# Patient Record
Sex: MALE | Race: WHITE | Employment: OTHER | URBAN - METROPOLITAN AREA
[De-identification: names, ages, dates, MRNs, and addresses within clinical notes are randomized per-mention and may not be internally consistent; named-entity substitution may affect disease eponyms.]

---

## 2020-01-23 ENCOUNTER — HOSPITAL ENCOUNTER (OUTPATIENT)
Age: 70
Discharge: HOME | End: 2020-01-23
Payer: MEDICARE

## 2020-01-23 LAB — GLUCOSE FASTING: 103 MG/DL (ref 70–99)

## 2020-01-23 PROCEDURE — 84681 ASSAY OF C-PEPTIDE: CPT

## 2020-01-23 PROCEDURE — 82941 ASSAY OF GASTRIN: CPT

## 2020-01-23 PROCEDURE — 83525 ASSAY OF INSULIN: CPT

## 2020-01-23 PROCEDURE — 84206 ASSAY OF PROINSULIN: CPT

## 2020-01-23 PROCEDURE — 36415 COLL VENOUS BLD VENIPUNCTURE: CPT

## 2020-01-23 PROCEDURE — 82947 ASSAY GLUCOSE BLOOD QUANT: CPT

## 2020-01-23 PROCEDURE — 84586 ASSAY OF VIP: CPT

## 2020-01-23 PROCEDURE — 83519 RIA NONANTIBODY: CPT

## 2020-01-24 LAB
C-PEPTIDE: 5.5 NG/ML (ref 0.8–3.5)
C-PEPTIDE: ABNORMAL NG/ML (ref 0.8–3.5)
INSULIN: 51 UIU/ML
INSULIN: NORMAL UIU/ML

## 2020-01-25 LAB
GASTRIN: <10 PG/ML (ref 0–100)
GASTRIN: NORMAL PG/ML (ref 0–100)
Lab: NORMAL
Lab: NORMAL
TEST NAME: NORMAL

## 2020-01-31 LAB
Lab: NORMAL
Lab: NORMAL
TEST NAME: NORMAL

## 2020-02-04 LAB
VASOACTIVE INTESTINAL POLYPEPTIDE PLASMA: <13 PG/ML (ref 0–60)
VASOACTIVE INTESTINAL POLYPEPTIDE PLASMA: NORMAL PG/ML (ref 0–60)

## 2020-02-05 LAB
Lab: NORMAL
Lab: NORMAL
TEST NAME: NORMAL

## 2020-08-24 ENCOUNTER — APPOINTMENT (OUTPATIENT)
Dept: CT IMAGING | Age: 70
End: 2020-08-24
Payer: MEDICARE

## 2020-08-24 ENCOUNTER — HOSPITAL ENCOUNTER (OUTPATIENT)
Age: 70
Setting detail: OBSERVATION
LOS: 1 days | Discharge: HOME | End: 2020-08-25
Attending: EMERGENCY MEDICINE | Admitting: EMERGENCY MEDICINE
Payer: MEDICARE

## 2020-08-24 ENCOUNTER — APPOINTMENT (OUTPATIENT)
Dept: MRI IMAGING | Age: 70
End: 2020-08-24
Payer: MEDICARE

## 2020-08-24 ENCOUNTER — APPOINTMENT (OUTPATIENT)
Dept: GENERAL RADIOLOGY | Age: 70
End: 2020-08-24
Payer: MEDICARE

## 2020-08-24 PROBLEM — H81.4 VERTIGO OF CENTRAL ORIGIN: Status: ACTIVE | Noted: 2020-08-24

## 2020-08-24 LAB
ALBUMIN SERPL-MCNC: 4.3 GM/DL (ref 3.4–5)
ALP BLD-CCNC: 49 IU/L (ref 40–129)
ALT SERPL-CCNC: 58 U/L (ref 10–40)
AMPHETAMINES: NEGATIVE
ANION GAP SERPL CALCULATED.3IONS-SCNC: 12 MMOL/L (ref 4–16)
AST SERPL-CCNC: 32 IU/L (ref 15–37)
BACTERIA: NEGATIVE /HPF
BARBITURATE SCREEN URINE: NEGATIVE
BASOPHILS ABSOLUTE: 0.1 K/CU MM
BASOPHILS RELATIVE PERCENT: 0.8 % (ref 0–1)
BENZODIAZEPINE SCREEN, URINE: NEGATIVE
BILIRUB SERPL-MCNC: 0.4 MG/DL (ref 0–1)
BILIRUBIN URINE: NEGATIVE MG/DL
BLOOD, URINE: NEGATIVE
BUN BLDV-MCNC: 22 MG/DL (ref 6–23)
CALCIUM SERPL-MCNC: 9.8 MG/DL (ref 8.3–10.6)
CANNABINOID SCREEN URINE: NEGATIVE
CHLORIDE BLD-SCNC: 99 MMOL/L (ref 99–110)
CLARITY: CLEAR
CO2: 22 MMOL/L (ref 21–32)
COCAINE METABOLITE: NEGATIVE
COLOR: YELLOW
CREAT SERPL-MCNC: 0.9 MG/DL (ref 0.9–1.3)
DIFFERENTIAL TYPE: ABNORMAL
EOSINOPHILS ABSOLUTE: 0.2 K/CU MM
EOSINOPHILS RELATIVE PERCENT: 1.7 % (ref 0–3)
GFR AFRICAN AMERICAN: >60 ML/MIN/1.73M2
GFR NON-AFRICAN AMERICAN: >60 ML/MIN/1.73M2
GLUCOSE BLD-MCNC: 114 MG/DL (ref 70–99)
GLUCOSE BLD-MCNC: 171 MG/DL (ref 70–99)
GLUCOSE BLD-MCNC: 179 MG/DL (ref 70–99)
GLUCOSE, URINE: NEGATIVE MG/DL
HCT VFR BLD CALC: 46.4 % (ref 42–52)
HEMOGLOBIN: 15.4 GM/DL (ref 13.5–18)
IMMATURE NEUTROPHIL %: 1.5 % (ref 0–0.43)
INR BLD: 0.98 INDEX
KETONES, URINE: NEGATIVE MG/DL
LEUKOCYTE ESTERASE, URINE: NEGATIVE
LYMPHOCYTES ABSOLUTE: 2.2 K/CU MM
LYMPHOCYTES RELATIVE PERCENT: 22.8 % (ref 24–44)
MCH RBC QN AUTO: 30 PG (ref 27–31)
MCHC RBC AUTO-ENTMCNC: 33.2 % (ref 32–36)
MCV RBC AUTO: 90.4 FL (ref 78–100)
MONOCYTES ABSOLUTE: 0.6 K/CU MM
MONOCYTES RELATIVE PERCENT: 6.2 % (ref 0–4)
MUCUS: ABNORMAL HPF
NITRITE URINE, QUANTITATIVE: NEGATIVE
NUCLEATED RBC %: 0 %
OPIATES, URINE: NEGATIVE
OXYCODONE: NEGATIVE
PDW BLD-RTO: 12.8 % (ref 11.7–14.9)
PH, URINE: 6 (ref 5–8)
PHENCYCLIDINE, URINE: NEGATIVE
PLATELET # BLD: 222 K/CU MM (ref 140–440)
PMV BLD AUTO: 10 FL (ref 7.5–11.1)
POTASSIUM SERPL-SCNC: 5 MMOL/L (ref 3.5–5.1)
PROTEIN UA: NEGATIVE MG/DL
PROTHROMBIN TIME: 11.9 SECONDS (ref 11.7–14.5)
RBC # BLD: 5.13 M/CU MM (ref 4.6–6.2)
RBC URINE: 1 /HPF (ref 0–3)
SEGMENTED NEUTROPHILS ABSOLUTE COUNT: 6.4 K/CU MM
SEGMENTED NEUTROPHILS RELATIVE PERCENT: 67 % (ref 36–66)
SODIUM BLD-SCNC: 133 MMOL/L (ref 135–145)
SPECIFIC GRAVITY UA: 1.03 (ref 1–1.03)
SQUAMOUS EPITHELIAL: <1 /HPF
TOTAL IMMATURE NEUTOROPHIL: 0.14 K/CU MM
TOTAL NUCLEATED RBC: 0 K/CU MM
TOTAL PROTEIN: 6.7 GM/DL (ref 6.4–8.2)
TRICHOMONAS: ABNORMAL /HPF
TROPONIN T: <0.01 NG/ML
TROPONIN T: <0.01 NG/ML
UROBILINOGEN, URINE: NORMAL MG/DL (ref 0.2–1)
WBC # BLD: 9.6 K/CU MM (ref 4–10.5)
WBC UA: ABNORMAL /HPF (ref 0–2)

## 2020-08-24 PROCEDURE — 82962 GLUCOSE BLOOD TEST: CPT

## 2020-08-24 PROCEDURE — 6370000000 HC RX 637 (ALT 250 FOR IP): Performed by: PHYSICIAN ASSISTANT

## 2020-08-24 PROCEDURE — 80307 DRUG TEST PRSMV CHEM ANLYZR: CPT

## 2020-08-24 PROCEDURE — 6360000002 HC RX W HCPCS: Performed by: PHYSICIAN ASSISTANT

## 2020-08-24 PROCEDURE — 96372 THER/PROPH/DIAG INJ SC/IM: CPT

## 2020-08-24 PROCEDURE — 70450 CT HEAD/BRAIN W/O DYE: CPT

## 2020-08-24 PROCEDURE — 36415 COLL VENOUS BLD VENIPUNCTURE: CPT

## 2020-08-24 PROCEDURE — 93005 ELECTROCARDIOGRAM TRACING: CPT | Performed by: EMERGENCY MEDICINE

## 2020-08-24 PROCEDURE — 85610 PROTHROMBIN TIME: CPT

## 2020-08-24 PROCEDURE — 96374 THER/PROPH/DIAG INJ IV PUSH: CPT

## 2020-08-24 PROCEDURE — G0378 HOSPITAL OBSERVATION PER HR: HCPCS

## 2020-08-24 PROCEDURE — 6360000004 HC RX CONTRAST MEDICATION: Performed by: PHYSICIAN ASSISTANT

## 2020-08-24 PROCEDURE — 94761 N-INVAS EAR/PLS OXIMETRY MLT: CPT

## 2020-08-24 PROCEDURE — 6370000000 HC RX 637 (ALT 250 FOR IP): Performed by: INTERNAL MEDICINE

## 2020-08-24 PROCEDURE — 85025 COMPLETE CBC W/AUTO DIFF WBC: CPT

## 2020-08-24 PROCEDURE — 2580000003 HC RX 258: Performed by: PHYSICIAN ASSISTANT

## 2020-08-24 PROCEDURE — 99285 EMERGENCY DEPT VISIT HI MDM: CPT

## 2020-08-24 PROCEDURE — 70551 MRI BRAIN STEM W/O DYE: CPT

## 2020-08-24 PROCEDURE — 70496 CT ANGIOGRAPHY HEAD: CPT

## 2020-08-24 PROCEDURE — 80053 COMPREHEN METABOLIC PANEL: CPT

## 2020-08-24 PROCEDURE — 2580000003 HC RX 258: Performed by: INTERNAL MEDICINE

## 2020-08-24 PROCEDURE — 71045 X-RAY EXAM CHEST 1 VIEW: CPT

## 2020-08-24 PROCEDURE — 6360000002 HC RX W HCPCS: Performed by: INTERNAL MEDICINE

## 2020-08-24 PROCEDURE — 70498 CT ANGIOGRAPHY NECK: CPT

## 2020-08-24 PROCEDURE — 84484 ASSAY OF TROPONIN QUANT: CPT

## 2020-08-24 PROCEDURE — 81001 URINALYSIS AUTO W/SCOPE: CPT

## 2020-08-24 RX ORDER — PROMETHAZINE HYDROCHLORIDE 25 MG/1
12.5 TABLET ORAL EVERY 6 HOURS PRN
Status: DISCONTINUED | OUTPATIENT
Start: 2020-08-24 | End: 2020-08-25 | Stop reason: HOSPADM

## 2020-08-24 RX ORDER — OYSTER SHELL CALCIUM WITH VITAMIN D 500; 200 MG/1; [IU]/1
1 TABLET, FILM COATED ORAL DAILY
COMMUNITY

## 2020-08-24 RX ORDER — ATORVASTATIN CALCIUM 10 MG/1
10 TABLET, FILM COATED ORAL NIGHTLY
COMMUNITY

## 2020-08-24 RX ORDER — ACETAMINOPHEN 650 MG/1
650 SUPPOSITORY RECTAL EVERY 6 HOURS PRN
Status: DISCONTINUED | OUTPATIENT
Start: 2020-08-24 | End: 2020-08-25 | Stop reason: HOSPADM

## 2020-08-24 RX ORDER — ONDANSETRON 2 MG/ML
4 INJECTION INTRAMUSCULAR; INTRAVENOUS EVERY 30 MIN PRN
Status: DISCONTINUED | OUTPATIENT
Start: 2020-08-24 | End: 2020-08-24

## 2020-08-24 RX ORDER — MECLIZINE HCL 12.5 MG/1
12.5 TABLET ORAL 3 TIMES DAILY PRN
Status: DISCONTINUED | OUTPATIENT
Start: 2020-08-24 | End: 2020-08-25 | Stop reason: HOSPADM

## 2020-08-24 RX ORDER — ATORVASTATIN CALCIUM 10 MG/1
10 TABLET, FILM COATED ORAL NIGHTLY
Status: DISCONTINUED | OUTPATIENT
Start: 2020-08-24 | End: 2020-08-25 | Stop reason: HOSPADM

## 2020-08-24 RX ORDER — TAMSULOSIN HYDROCHLORIDE 0.4 MG/1
0.4 CAPSULE ORAL NIGHTLY
Status: DISCONTINUED | OUTPATIENT
Start: 2020-08-24 | End: 2020-08-25 | Stop reason: HOSPADM

## 2020-08-24 RX ORDER — SODIUM CHLORIDE 9 MG/ML
INJECTION, SOLUTION INTRAVENOUS CONTINUOUS
Status: DISCONTINUED | OUTPATIENT
Start: 2020-08-24 | End: 2020-08-25 | Stop reason: HOSPADM

## 2020-08-24 RX ORDER — SODIUM CHLORIDE 0.9 % (FLUSH) 0.9 %
10 SYRINGE (ML) INJECTION PRN
Status: DISCONTINUED | OUTPATIENT
Start: 2020-08-24 | End: 2020-08-25 | Stop reason: HOSPADM

## 2020-08-24 RX ORDER — FINASTERIDE 5 MG/1
5 TABLET, FILM COATED ORAL DAILY
COMMUNITY

## 2020-08-24 RX ORDER — ONDANSETRON 2 MG/ML
4 INJECTION INTRAMUSCULAR; INTRAVENOUS EVERY 6 HOURS PRN
Status: DISCONTINUED | OUTPATIENT
Start: 2020-08-24 | End: 2020-08-25 | Stop reason: HOSPADM

## 2020-08-24 RX ORDER — FAMOTIDINE 20 MG/1
20 TABLET, FILM COATED ORAL 2 TIMES DAILY
Status: DISCONTINUED | OUTPATIENT
Start: 2020-08-24 | End: 2020-08-25 | Stop reason: HOSPADM

## 2020-08-24 RX ORDER — SODIUM CHLORIDE 0.9 % (FLUSH) 0.9 %
10 SYRINGE (ML) INJECTION EVERY 12 HOURS SCHEDULED
Status: DISCONTINUED | OUTPATIENT
Start: 2020-08-24 | End: 2020-08-25 | Stop reason: HOSPADM

## 2020-08-24 RX ORDER — ACETAMINOPHEN 325 MG/1
650 TABLET ORAL EVERY 6 HOURS PRN
Status: DISCONTINUED | OUTPATIENT
Start: 2020-08-24 | End: 2020-08-25 | Stop reason: HOSPADM

## 2020-08-24 RX ORDER — POLYETHYLENE GLYCOL 3350 17 G/17G
17 POWDER, FOR SOLUTION ORAL DAILY PRN
Status: DISCONTINUED | OUTPATIENT
Start: 2020-08-24 | End: 2020-08-25 | Stop reason: HOSPADM

## 2020-08-24 RX ORDER — CALCIUM CARBONATE 200(500)MG
500 TABLET,CHEWABLE ORAL
COMMUNITY
Start: 2020-05-22

## 2020-08-24 RX ORDER — FINASTERIDE 5 MG/1
5 TABLET, FILM COATED ORAL DAILY
Status: DISCONTINUED | OUTPATIENT
Start: 2020-08-25 | End: 2020-08-25 | Stop reason: HOSPADM

## 2020-08-24 RX ORDER — TAMSULOSIN HYDROCHLORIDE 0.4 MG/1
0.4 CAPSULE ORAL NIGHTLY
COMMUNITY

## 2020-08-24 RX ORDER — SODIUM CHLORIDE 0.9 % (FLUSH) 0.9 %
10 SYRINGE (ML) INJECTION 2 TIMES DAILY
Status: DISCONTINUED | OUTPATIENT
Start: 2020-08-24 | End: 2020-08-25 | Stop reason: HOSPADM

## 2020-08-24 RX ORDER — ASPIRIN 81 MG/1
81 TABLET, CHEWABLE ORAL NIGHTLY
COMMUNITY

## 2020-08-24 RX ORDER — LISINOPRIL 10 MG/1
10 TABLET ORAL DAILY
COMMUNITY

## 2020-08-24 RX ORDER — MECLIZINE HYDROCHLORIDE 25 MG/1
25 TABLET ORAL ONCE
Status: COMPLETED | OUTPATIENT
Start: 2020-08-24 | End: 2020-08-24

## 2020-08-24 RX ORDER — ASPIRIN 81 MG/1
81 TABLET, CHEWABLE ORAL NIGHTLY
Status: DISCONTINUED | OUTPATIENT
Start: 2020-08-24 | End: 2020-08-25 | Stop reason: HOSPADM

## 2020-08-24 RX ORDER — SPIRONOLACTONE 25 MG/1
25 TABLET ORAL DAILY
COMMUNITY

## 2020-08-24 RX ADMIN — SODIUM CHLORIDE, PRESERVATIVE FREE 10 ML: 5 INJECTION INTRAVENOUS at 20:33

## 2020-08-24 RX ADMIN — FAMOTIDINE 20 MG: 20 TABLET, FILM COATED ORAL at 20:34

## 2020-08-24 RX ADMIN — MECLIZINE HYDROCHLORIDE 25 MG: 25 TABLET ORAL at 07:15

## 2020-08-24 RX ADMIN — ENOXAPARIN SODIUM 40 MG: 40 INJECTION SUBCUTANEOUS at 20:32

## 2020-08-24 RX ADMIN — ONDANSETRON 4 MG: 2 INJECTION INTRAMUSCULAR; INTRAVENOUS at 07:15

## 2020-08-24 RX ADMIN — SODIUM CHLORIDE: 9 INJECTION, SOLUTION INTRAVENOUS at 07:16

## 2020-08-24 RX ADMIN — MECLIZINE 12.5 MG: 12.5 TABLET ORAL at 20:32

## 2020-08-24 RX ADMIN — SODIUM CHLORIDE: 9 INJECTION, SOLUTION INTRAVENOUS at 18:54

## 2020-08-24 RX ADMIN — IOPAMIDOL 100 ML: 755 INJECTION, SOLUTION INTRAVENOUS at 06:46

## 2020-08-24 ASSESSMENT — PAIN SCALES - GENERAL
PAINLEVEL_OUTOF10: 0
PAINLEVEL_OUTOF10: 0

## 2020-08-24 NOTE — ED PROVIDER NOTES
Patient Identification  Christi Schulz is a 68 y.o. male    Chief Complaint  Nausea      HPI  (History provided by patient)  This is a 68 y.o. male who was brought in by EMS for chief complaint of nausea, dizziness. Patient reports he was already awake at 4 AM when symptoms started. States he began suddenly feeling like the room was spinning and nauseous, vomited twice. States there is spinning feeling it feels better if he closes his eyes and lays down. Still feels lightheaded in this position. Denies headache, vision changes, difficulty speaking or swallowing, numbness, weakness. + diaphoresis. No CP, SOB, abdominal pain. REVIEW OF SYSTEMS    Constitutional:  Denies fever, chills  HENT:  Denies sore throat or ear pain   Eyes: Denies vision changes, eye pain  Cardiovascular:  Denies chest pain, syncope  Respiratory:  Denies shortness of breath, cough   GI:  Denies abdominal pain. + nausea, vomiting  :  Denies dysuria, discharge  Musculoskeletal:  Denies back pain, joint pain  Skin:  Denies rash, pruritis  Neurologic:  Denies headache, focal weakness, or sensory changes     See HPI and nursing notes for additional information     I have reviewed the following nursing documentation:  Allergies: No Known Allergies    Past medical history:  has a past medical history of CAD (coronary artery disease) and Hypertension. Past surgical history:  has a past surgical history that includes hernia repair. Home medications:   Prior to Admission medications    Not on File       Social history:  reports that he has never smoked. He has never used smokeless tobacco. He reports previous alcohol use. He reports that he does not use drugs. Family history:  History reviewed. No pertinent family history. Exam  /66   Pulse 57   Resp 18   Ht 5' 10\" (1.778 m)   Wt 230 lb (104.3 kg)   SpO2 97%   BMI 33.00 kg/m²   Nursing note and vitals reviewed. Constitutional: Well developed, well nourished.  No acute distress. HENT:      Head: Normocephalic and atraumatic. Ears: External ears normal.      Nose: Nose normal.     Mouth: Membrane mucosa moist and pink. No posterior oropharynx erythema or tonsillar edema  Eyes: Anicteric sclera. No discharge, PERRL  Neck: Supple. Trachea midline. Cardiovascular: RRR, no murmurs, rubs, or gallops, radial pulses 2+ bilaterally. Pulmonary/Chest: Effort normal. No respiratory distress. CTAB. No stridor. No wheezes. No rales. Abdominal: Soft. Nontender to palpation. No distension. No guarding, rebound tenderness, or evidence of ascites. : No CVA tenderness. Musculoskeletal: Moves all extremities. No gross deformity. NEUROLOGICAL: Awake and alert. GCS 15. Cranial nerves 2-12 grossly intact. Strength 5/5 throughout. Light touch sensation intact throughout. Finger to nose WNL. Heel shin intact bilat. No expressive or receptive aphasia. Skin: Warm and dry. No rash. Psychiatric: Normal mood and affect. Behavior is normal.      EKG   Please see Dr. Osmany Alvarado' note for EKG read. Radiographs (if obtained):  [] The following radiograph was interpreted by myself in the absence of a radiologist:   [x] Radiologist's Report Reviewed:  XR CHEST PORTABLE   Final Result   No acute abnormality. CTA HEAD NECK W CONTRAST   Final Result   Unremarkable CTA of the head and neck. Mild cerebral white matter chronic microvascular ischemic disease. CT HEAD WO CONTRAST   Final Result   No acute intracranial abnormality. Mild cerebral white matter chronic microvascular ischemic disease. RECOMMENDATIONS:   For clinical suspicion of acute ischemia, recommend MRI brain with   diffusion-weighted imaging for further evaluation.          MRI BRAIN WO CONTRAST    (Results Pending)          Labs  Results for orders placed or performed during the hospital encounter of 08/24/20   CBC Auto Differential   Result Value Ref Range    WBC 9.6 4.0 - 10.5 K/CU MM RBC 5.13 4.6 - 6.2 M/CU MM    Hemoglobin 15.4 13.5 - 18.0 GM/DL    Hematocrit 46.4 42 - 52 %    MCV 90.4 78 - 100 FL    MCH 30.0 27 - 31 PG    MCHC 33.2 32.0 - 36.0 %    RDW 12.8 11.7 - 14.9 %    Platelets 581 583 - 818 K/CU MM    MPV 10.0 7.5 - 11.1 FL    Differential Type AUTOMATED DIFFERENTIAL     Segs Relative 67.0 (H) 36 - 66 %    Lymphocytes % 22.8 (L) 24 - 44 %    Monocytes % 6.2 (H) 0 - 4 %    Eosinophils % 1.7 0 - 3 %    Basophils % 0.8 0 - 1 %    Segs Absolute 6.4 K/CU MM    Lymphocytes Absolute 2.2 K/CU MM    Monocytes Absolute 0.6 K/CU MM    Eosinophils Absolute 0.2 K/CU MM    Basophils Absolute 0.1 K/CU MM    Nucleated RBC % 0.0 %    Total Nucleated RBC 0.0 K/CU MM    Total Immature Neutrophil 0.14 K/CU MM    Immature Neutrophil % 1.5 (H) 0 - 0.43 %   Comprehensive Metabolic Panel w/ Reflex to MG   Result Value Ref Range    Sodium 133 (L) 135 - 145 MMOL/L    Potassium 5.0 3.5 - 5.1 MMOL/L    Chloride 99 99 - 110 mMol/L    CO2 22 21 - 32 MMOL/L    BUN 22 6 - 23 MG/DL    CREATININE 0.9 0.9 - 1.3 MG/DL    Glucose 179 (H) 70 - 99 MG/DL    Calcium 9.8 8.3 - 10.6 MG/DL    Alb 4.3 3.4 - 5.0 GM/DL    Total Protein 6.7 6.4 - 8.2 GM/DL    Total Bilirubin 0.4 0.0 - 1.0 MG/DL    ALT 58 (H) 10 - 40 U/L    AST 32 15 - 37 IU/L    Alkaline Phosphatase 49 40 - 129 IU/L    GFR Non-African American >60 >60 mL/min/1.73m2    GFR African American >60 >60 mL/min/1.73m2    Anion Gap 12 4 - 16   Troponin   Result Value Ref Range    Troponin T <0.010 <0.01 NG/ML   Protime-INR   Result Value Ref Range    Protime 11.9 11.7 - 14.5 SECONDS    INR 0.98 INDEX   Urinalysis   Result Value Ref Range    Color, UA YELLOW YELLOW    Clarity, UA CLEAR CLEAR    Glucose, Urine NEGATIVE NEGATIVE MG/DL    Bilirubin Urine NEGATIVE NEGATIVE MG/DL    Ketones, Urine NEGATIVE NEGATIVE MG/DL    Specific Gravity, UA 1.034 1.001 - 1.035    Blood, Urine NEGATIVE NEGATIVE    pH, Urine 6.0 5.0 - 8.0    Protein, UA NEGATIVE NEGATIVE MG/DL    Urobilinogen, Urine NORMAL 0.2 - 1.0 MG/DL    Nitrite Urine, Quantitative NEGATIVE NEGATIVE    Leukocyte Esterase, Urine NEGATIVE NEGATIVE    RBC, UA 1 0 - 3 /HPF    WBC, UA NONE SEEN 0 - 2 /HPF    Bacteria, UA NEGATIVE NEGATIVE /HPF    Squam Epithel, UA <1 /HPF    Mucus, UA RARE (A) NEGATIVE HPF    Trichomonas, UA NONE SEEN NONE SEEN /HPF   Urine Drug Screen   Result Value Ref Range    Cannabinoid Scrn, Ur NEGATIVE NEGATIVE    Amphetamines NEGATIVE NEGATIVE    Cocaine Metabolite NEGATIVE NEGATIVE    Benzodiazepine Screen, Urine NEGATIVE NEGATIVE    Barbiturate Screen, Ur NEGATIVE NEGATIVE    Opiates, Urine NEGATIVE NEGATIVE    Phencyclidine, Urine NEGATIVE NEGATIVE    Oxycodone NEGATIVE NEGATIVE   POCT Glucose   Result Value Ref Range    POC Glucose 171 (H) 70 - 99 MG/DL   EKG 12 Lead   Result Value Ref Range    Ventricular Rate 57 BPM    Atrial Rate 57 BPM    P-R Interval 186 ms    QRS Duration 88 ms    Q-T Interval 418 ms    QTc Calculation (Bazett) 406 ms    P Axis 19 degrees    R Axis 107 degrees    T Axis 32 degrees    Diagnosis       Sinus bradycardia  Rightward axis  Borderline ECG  No previous ECGs available           MDM  Patient presents for sudden onset of what appears to be vertigo and vomiting. Symptoms persisted in ED, started on meclizine and Zofran and normal saline bolus only mild improvement. Patient had stroke alert called on arrival, CT head and CTA head and neck revealed no acute findings, seen by stroke neurologist who determined patient was not TPA candidate, recommended admission for MRI brain. Discussed with patient and he is comfortable with this. Consult placed to Dr. Shana Nguyen who agreed to admit. This patient was also seen and evaluated by Dr. Melissa Geronimo. Final Impression  1. Dizziness    2. Non-intractable vomiting with nausea, unspecified vomiting type        Blood pressure 110/66, pulse 57, resp. rate 18, height 5' 10\" (1.778 m), weight 230 lb (104.3 kg), SpO2 97 %.      Disposition:  Admit to telemetry in stable condition. Patient was given scripts for the following medications. I counseled patient how to take these medications. New Prescriptions    No medications on file       This chart was generated using the [address] [place] [address] [address]  dictation system. I created this record but it may contain dictation errors given the limitations of this technology.         Tim Howell PA-C  08/24/20 6923

## 2020-08-24 NOTE — ED NOTES
Report given to Maria Victoria Prakash. Pt will be transported to 3003.      Souleymane Dominguez RN  08/24/20 2067

## 2020-08-24 NOTE — ED TRIAGE NOTES
PT stated he woke up and started to feel dizzy and started vomiting. PT denies any pain at this time. PT states he feels light headed.

## 2020-08-24 NOTE — ED NOTES
Portable xray at bedside. States neuro is on with a second stroke alert and will be on robot when available.         Nora Decker RN  08/24/20 8101

## 2020-08-24 NOTE — ED NOTES
[id number] paged hospitalist     Marlen Del Valle  08/24/20 [id number] hospitalist returned call     Marlen Del Valle  08/24/20 8297

## 2020-08-24 NOTE — ED NOTES
Pt's spouse updated x's 3 on pt's condition. Spouse advised that VS are stable, MRI has been completed and that Dr will review results with pt once results are available.       Laurent Adair RN  08/24/20 9337

## 2020-08-24 NOTE — ED PROVIDER NOTES
I independently examined and evaluated Tarah Dior. Emergency Department Encounter    Patient: Tarah Dior  MRN: [id number]  : 1950  Date of Evaluation: 2020  ED Provider:  Mile Ramirez    Triage Chief Complaint:   Nausea    [place]:  Tarah Dior is a 68 y.o. male that presents with complaint of nausea, vomiting and dizziness. He was awake early this morning, at 4 AM noted sudden onset dizziness like room spinning, having nausea and vomiting. Prefers to keep his eyes closed because of how severe the symptoms are. No double vision or blurred vision. No falls or injury or trauma. No headache. No weakness on one side of his body or the other. No numbness on one side of his body or the other. No slurred speech. No confusion. ROS - see HPI, below listed is current ROS at time of my eval:  10 systems reviewed and negative except as above. Past Medical History:   Diagnosis Date    CAD (coronary artery disease)     Hypertension      Past Surgical History:   Procedure Laterality Date    HERNIA REPAIR       History reviewed. No pertinent family history.   Social History     Socioeconomic History    Marital status:      Spouse name: Not on file    Number of children: Not on file    Years of education: Not on file    Highest education level: Not on file   Occupational History    Not on file   Social Needs    Financial resource strain: Not on file    Food insecurity     Worry: Not on file     Inability: Not on file    Transportation needs     Medical: Not on file     Non-medical: Not on file   Tobacco Use    Smoking status: Never Smoker    Smokeless tobacco: Never Used   Substance and Sexual Activity    Alcohol use: Not Currently    Drug use: Never    Sexual activity: Not on file   Lifestyle    Physical activity     Days per week: Not on file     Minutes per session: Not on file    Stress: Not on file   Relationships    Social connections     Talks on phone: Not on file     Gets together: Not on file     Attends [employer] service: Not on file     Active member of club or organization: Not on file     Attends meetings of clubs or organizations: Not on file     Relationship status: Not on file    Intimate partner violence     Fear of current or ex partner: Not on file     Emotionally abused: Not on file     Physically abused: Not on file     Forced sexual activity: Not on file   Other Topics Concern    Not on file   Social History Narrative    Not on file     Current Facility-Administered Medications   Medication Dose Route Frequency Provider Last Rate Last Dose    ondansetron (ZOFRAN) injection 4 mg  4 mg Intravenous Q30 Min PRN [place] Guerrero PA-C   4 mg at 08/24/20 0715    0.9 % sodium chloride infusion   Intravenous Continuous Jamaica Guerrero PA-C 150 mL/hr at 08/24/20 3806      sodium chloride flush 0.9 % injection 10 mL  10 mL Intravenous BID Lazarus Guerrero PA-C         No current outpatient medications on file. No Known Allergies    Nursing Notes Reviewed    Physical Exam:  ED Triage Vitals   Enc Vitals Group      BP 08/24/20 0735 110/66      Pulse 08/24/20 0735 57      Resp 08/24/20 0735 18      Temp --       Temp src --       SpO2 08/24/20 0735 97 %      Weight 08/24/20 0632 230 lb (104.3 kg)      Height 08/24/20 0738 5' 10\" (1.778 m)      Head Circumference --       Peak Flow --       Pain Score --       Pain Loc --       Pain Edu? --       Excl. in [address]? --           My pulse ox interpretation is - normal    General appearance:  No acute distress. Skin:  Warm. Dry. No rash. Eye:  Extraocular movements intact. Ears, nose, mouth and throat:  Oral mucosa moist   Neck:  Trachea midline. Extremity:  No swelling. Normal ROM     Heart:  Regular rate and rhythm, normal S1 & S2, no extra heart sounds. Perfusion:  intact   Respiratory:  Lungs clear to auscultation bilaterally. Respirations nonlabored.      Abdominal:  Normal bowel sounds. Soft. Nontender. Non distended. Neurological:      Mental Status Exam:   Alert and oriented times three, follows commands, speech and language intact    Cranial Iafost-VX-TZH Intact.     Cranial nerve II  Visual acuity: normal  Cranial nerve III  Pupils: equal, round, reactive to light  Cranial nerves III, IV, VI  Extraocular Movements: intact  Cranial nerve V  Facial sensation: intact  Cranial nerve VII  Facial strength: intact  Cranial nerve VIII  Hearing: intact  Cranial nerve IX  Palate: intact  Cranial nerve XI  Shoulder shrug: intact  Cranial nerve XII  Tongue movement: normal    Motor:   Drift: absent  Motor exam is symmetrical 5 out of 5 all extremities bilaterally  Tone: normal  Abnormal Movements: Absent    DTRs- intact bilaterally and symmetrical.  Toes-downgoing bilaterally  Sensory:  Light Touch  Right Upper Extremity: normal  Left Upper Extremity: normal  Right Lower Extremity: normal  Left Lower Extremity: normal      I have reviewed and interpreted all of the currently available lab results from this visit (if applicable):  Results for orders placed or performed during the hospital encounter of 08/24/20   Comprehensive Metabolic Panel w/ Reflex to MG   Result Value Ref Range    Sodium 133 (L) 135 - 145 MMOL/L    Potassium 5.0 3.5 - 5.1 MMOL/L    Chloride 99 99 - 110 mMol/L    CO2 22 21 - 32 MMOL/L    BUN 22 6 - 23 MG/DL    CREATININE 0.9 0.9 - 1.3 MG/DL    Glucose 179 (H) 70 - 99 MG/DL    Calcium 9.8 8.3 - 10.6 MG/DL    Alb 4.3 3.4 - 5.0 GM/DL    Total Protein 6.7 6.4 - 8.2 GM/DL    Total Bilirubin 0.4 0.0 - 1.0 MG/DL    ALT 58 (H) 10 - 40 U/L    AST 32 15 - 37 IU/L    Alkaline Phosphatase 49 40 - 129 IU/L    GFR Non-African American >60 >60 mL/min/1.73m2    GFR African American >60 >60 mL/min/1.73m2    Anion Gap 12 4 - 16   Troponin   Result Value Ref Range    Troponin T <0.010 <0.01 NG/ML   Protime-INR   Result Value Ref Range    Protime 11.9 11.7 - 14.5 SECONDS    INR 0.98 INDEX POCT Glucose   Result Value Ref Range    POC Glucose 171 (H) 70 - 99 MG/DL   EKG 12 Lead   Result Value Ref Range    Ventricular Rate 57 BPM    Atrial Rate 57 BPM    P-R Interval 186 ms    QRS Duration 88 ms    Q-T Interval 418 ms    QTc Calculation (Bazett) 406 ms    P Axis 19 degrees    R Axis 107 degrees    T Axis 32 degrees    Diagnosis       Sinus bradycardia  Rightward axis  Borderline ECG  No previous ECGs available        Radiographs (if obtained):  Radiologist's Report Reviewed:  No results found. EKG (if obtained): (All EKG's are interpreted by myself in the absence of a cardiologist)  Is bradycardia with rate of 57 bpm, normal intervals. No ST elevation. Rightward axis. No previous to compare. Stroke alert timing details    1. Physician evaluation performed at:  0610  2. Stroke alert called at: 0610    3. CT results obtained at:  reviewed at 0659  4. Decision to administer tPA at:  N/A- NIH 0      t-PA NOT given due to the following EXCLUSION CRITERIA (only those listed):  Minor or isolated neurological signs      MDM:  49-year-old male with history as above presents with acute onset dizziness, nausea and vomiting. . Patient's initial NIH on my exam is 0. Stroke alert called given concern that could be a potential posterior stroke. Spoke with Dr. Lizzy Nolen on phone at [phone]- patient with NIH 0, likely not TPA candidate. She evaluated him on telestroke and confirmed no TPA. If [facility] concerning we will call them back, otherwise symptomatic treatment, likely peripheral but if not improving plan for admission for MRI/neuro. CT head and CT angio are negative. 26- Labs are unremarkable, plan for admission. All diagnostic, treatment, and disposition decisions were made by myself in conjunction with the advanced practice provider. For all further details of the patient's emergency department visit, please see the advanced practice provider's documentation.       Clinical Impression:  1. Dizziness    2. Non-intractable vomiting with nausea, unspecified vomiting type      Disposition referral (if applicable):  No follow-up provider specified. Disposition medications (if applicable):  New Prescriptions    No medications on file     ED Provider Disposition Time  DISPOSITION        Comment: Please note this report has been produced using speech recognition software and may contain errors related to that system including errors in grammar, punctuation, and spelling, as well as words and phrases that may be inappropriate. Efforts were made to edit the dictations.            Shelli Wiley MD  08/24/20 8508

## 2020-08-24 NOTE — H&P
History and Physical      Name:  Ramila Rock /Age/Sex: 1950  (68 y.o. male)   MRN & CSN:  [id number] & [id number] Admission Date/Time: 2020  5:45 AM   Location:  ED31/ED-31 PCP: Junior Butler 112 Day: 1    History of Present Illness:   History provided by patient and Patient's wife  Chief Complaint: Dizziness  Ramila Rock is a 68 y.o.  male  who presents with complaints of Dizziness, nausea and vomiting. He woke up in the morning at around 4 AM started having sudden onset of dizziness like room spinning, having nausea and vomiting. He describes episode as room spinning sensation. He also had an episode of diarrhea associated with that. He says has been having increased urinary frequency with no burning sensation. Denies having any chest pain, SOB, weakness, tingling, numbness, abdominal pain. Ten point ROS reviewed negative, unless as noted above    The patient presented with the above mentioned complaints, did CT head, CTA head and neck unremarkable. Tele neurology assessed patient, who said he is not a candidate for TPA as his NIHSS score is zero.     Objective:   No intake or output data in the 24 hours ending 20 1023   Vitals:   Vitals:    20 0735   BP: 110/66   Pulse: 57   Resp: 18   SpO2: 97%     Physical Exam:   General Appearance: alert and oriented to person, place and time, in no acute distress  Cardiovascular: normal rate, regular rhythm, normal S1 and S2  Pulmonary/Chest: clear to auscultation bilaterally  Abdomen: soft, non-tender, non-distended, normal bowel sounds, no masses   Extremities: no cyanosis, clubbing or edema, pulse   Skin: warm and dry, no rash or erythema  Head: normocephalic and atraumatic  Eyes: pupils equal, round, and reactive to light  Neck: supple and non-tender without mass, no thyromegaly   Musculoskeletal: normal range of motion, no joint swelling, deformity or tenderness  Neurological: alert, oriented, normal speech, no focal findings or movement disorder noted    Past Medical History:      Past Medical History:   Diagnosis Date    CAD (coronary artery disease)     Hypertension      PSHX:  has a past surgical history that includes hernia repair. Allergies: No Known Allergies    FAM HX: family history is not on file.   Soc HX:   Social History     Socioeconomic History    Marital status:      Spouse name: None    Number of children: None    Years of education: None    Highest education level: None   Occupational History    None   Social Needs    Financial resource strain: None    Food insecurity     Worry: None     Inability: None    Transportation needs     Medical: None     Non-medical: None   Tobacco Use    Smoking status: Never Smoker    Smokeless tobacco: Never Used   Substance and Sexual Activity    Alcohol use: Not Currently    Drug use: Never    Sexual activity: None   Lifestyle    Physical activity     Days per week: None     Minutes per session: None    Stress: None   Relationships    Social connections     Talks on phone: None     Gets together: None     Attends Christian service: None     Active member of club or organization: None     Attends meetings of clubs or organizations: None     Relationship status: None    Intimate partner violence     Fear of current or ex partner: None     Emotionally abused: None     Physically abused: None     Forced sexual activity: None   Other Topics Concern    None   Social History Narrative    None       Medications:   Medications:    sodium chloride flush  10 mL Intravenous BID      Infusions:    sodium chloride 150 mL/hr at 08/24/20 0716     PRN Meds: ondansetron, 4 mg, Q30 Min PRN        Labs:     CBC:   Lab Results   Component Value Date    WBC 9.6 08/24/2020    RBC 5.13 08/24/2020    HGB 15.4 08/24/2020    HCT 46.4 08/24/2020     08/24/2020    MCV 90.4 08/24/2020     BMP:    Lab Results   Component Value Date     08/24/2020 K 5.0 08/24/2020    CL 99 08/24/2020    CO2 22 08/24/2020    BUN 22 08/24/2020    CREATININE 0.9 08/24/2020    GLUCOSE 179 08/24/2020    CALCIUM 9.8 08/24/2020     Hepatic Function Panel:    Lab Results   Component Value Date    ALKPHOS 49 08/24/2020    AST 32 08/24/2020    ALT 58 08/24/2020    PROT 6.7 08/24/2020    LABALBU 4.3 08/24/2020    BILITOT 0.4 08/24/2020     Magnesium:  No results found for: MG  Cardiac Enzymes: No results found for: CKTOTAL, CKMB, CKMBINDEX, TROPONINI  LDH:  No results found for: LDH  PT/INR:    Lab Results   Component Value Date    PROTIME 11.9 08/24/2020    INR 0.98 08/24/2020     U/A:   Lab Results   Component Value Date    LEUKOCYTESUR NEGATIVE 08/24/2020    WBCUA NONE SEEN 08/24/2020    RBCUA 1 08/24/2020    BACTERIA NEGATIVE 08/24/2020    SPECGRAV 1.034 08/24/2020    BLOODU NEGATIVE 08/24/2020     ABG:  No results found for: PHART, YAF5YBA, PO2ART, M7LJONIQ, DBE4OJZ, BEART  TSH:  No results found for: TSH  Cardiac Enzymes: No results found for: CKTOTAL, CKMB, CKMBINDEX, TROPONINI    Assessment and Plan:   Aurelio Martinez is a 68 y.o.  male  who presents with Dizziness    Dizziness  Vertigo r/o central origin  Likely dehydration  Continue fluids  Initial Troponin negative  EKG with no ST/T changes  CT head with no acute changes  Repeat troponin X2  MRI WO contrast ordered  Discussed with wife, who is concerned about heart attack, explained that he does not have chest pain, acknowledging could be atypical symptoms, would get further cardiac work up. She discussed with her friend who is a retired general surgeon saying need for stat MRI or transfer to other hospital if we can not do it. Explained him and patient's wife that he is been already assessed by [place] [id number] neurology and determined as not a TPA candidate. I have explained that patient does not have any focal weakness and need to rule out for any posterior cerebral deficits.  His CTA with no major vessel occlusions, would not warrant intervention either  Cardiology consult placed  Ordered 2D ECHO  MRI brain with no evidence of acute stroke.  Updated wife regarding the results    Hyperparathyroidism s/p  Parathyroidectomy on 5/21/2020  Calcium 9.8 today  Will restart his Vitamin D3 and Calcium     CAD/Ischemic cardiomyopathy  Small inferior wall infarct with no ischemia per [facility] records  RCA occlusion in 2004  On Aspirin, will continue  Hold BB(Coreg 6.25 BID), Lisinopril 10 mg and Spironolactone(25 mg) due to borderline blood pressure, will restart as tolerated    HLD  Continue Statin    Hypertension  Hold Coreg and Lisinopril     Diet Diet NPO Effective Now   DVT Prophylaxis [x] Lovenox, []  Heparin, [] SCDs, [] Ambulation   GI Prophylaxis [] PPI,  [x] H2 Blocker,  [] Carafate,  [] Diet/Tube Feeds   Code Status Full code   Disposition Patient requires continued admission due to Vertigo   MDM [] Low, [x] Moderate,[]  High       Electronically signed by Simran Victoria MD on 8/24/2020 at 10:23 AM

## 2020-08-25 VITALS
WEIGHT: 230 LBS | BODY MASS INDEX: 32.93 KG/M2 | OXYGEN SATURATION: 100 % | DIASTOLIC BLOOD PRESSURE: 65 MMHG | TEMPERATURE: 97.8 F | RESPIRATION RATE: 20 BRPM | HEART RATE: 66 BPM | SYSTOLIC BLOOD PRESSURE: 108 MMHG | HEIGHT: 70 IN

## 2020-08-25 LAB
ANION GAP SERPL CALCULATED.3IONS-SCNC: 11 MMOL/L (ref 4–16)
BUN BLDV-MCNC: 18 MG/DL (ref 6–23)
CALCIUM SERPL-MCNC: 8.9 MG/DL (ref 8.3–10.6)
CHLORIDE BLD-SCNC: 103 MMOL/L (ref 99–110)
CO2: 21 MMOL/L (ref 21–32)
CREAT SERPL-MCNC: 0.7 MG/DL (ref 0.9–1.3)
EKG ATRIAL RATE: 57 BPM
EKG DIAGNOSIS: NORMAL
EKG P AXIS: 19 DEGREES
EKG P-R INTERVAL: 186 MS
EKG Q-T INTERVAL: 418 MS
EKG QRS DURATION: 88 MS
EKG QTC CALCULATION (BAZETT): 406 MS
EKG R AXIS: 107 DEGREES
EKG T AXIS: 32 DEGREES
EKG VENTRICULAR RATE: 57 BPM
GFR AFRICAN AMERICAN: >60 ML/MIN/1.73M2
GFR NON-AFRICAN AMERICAN: >60 ML/MIN/1.73M2
GLUCOSE BLD-MCNC: 114 MG/DL (ref 70–99)
HCT VFR BLD CALC: 40.4 % (ref 42–52)
HEMOGLOBIN: 13.4 GM/DL (ref 13.5–18)
LV EF: 53 %
LVEF MODALITY: NORMAL
MCH RBC QN AUTO: 30.5 PG (ref 27–31)
MCHC RBC AUTO-ENTMCNC: 33.2 % (ref 32–36)
MCV RBC AUTO: 92 FL (ref 78–100)
PDW BLD-RTO: 12.9 % (ref 11.7–14.9)
PLATELET # BLD: 191 K/CU MM (ref 140–440)
PMV BLD AUTO: 9.7 FL (ref 7.5–11.1)
POTASSIUM SERPL-SCNC: 4.3 MMOL/L (ref 3.5–5.1)
RBC # BLD: 4.39 M/CU MM (ref 4.6–6.2)
SODIUM BLD-SCNC: 135 MMOL/L (ref 135–145)
TROPONIN T: <0.01 NG/ML
WBC # BLD: 13.7 K/CU MM (ref 4–10.5)

## 2020-08-25 PROCEDURE — 6360000002 HC RX W HCPCS: Performed by: INTERNAL MEDICINE

## 2020-08-25 PROCEDURE — 99203 OFFICE O/P NEW LOW 30 MIN: CPT | Performed by: INTERNAL MEDICINE

## 2020-08-25 PROCEDURE — 36415 COLL VENOUS BLD VENIPUNCTURE: CPT

## 2020-08-25 PROCEDURE — 93010 ELECTROCARDIOGRAM REPORT: CPT | Performed by: INTERNAL MEDICINE

## 2020-08-25 PROCEDURE — 85027 COMPLETE CBC AUTOMATED: CPT

## 2020-08-25 PROCEDURE — 80048 BASIC METABOLIC PNL TOTAL CA: CPT

## 2020-08-25 PROCEDURE — 84484 ASSAY OF TROPONIN QUANT: CPT

## 2020-08-25 PROCEDURE — G0378 HOSPITAL OBSERVATION PER HR: HCPCS

## 2020-08-25 PROCEDURE — 6370000000 HC RX 637 (ALT 250 FOR IP): Performed by: INTERNAL MEDICINE

## 2020-08-25 PROCEDURE — 6370000000 HC RX 637 (ALT 250 FOR IP): Performed by: PHYSICIAN ASSISTANT

## 2020-08-25 PROCEDURE — 96372 THER/PROPH/DIAG INJ SC/IM: CPT

## 2020-08-25 PROCEDURE — 2580000003 HC RX 258: Performed by: INTERNAL MEDICINE

## 2020-08-25 PROCEDURE — 93306 TTE W/DOPPLER COMPLETE: CPT

## 2020-08-25 RX ADMIN — FINASTERIDE 5 MG: 5 TABLET, FILM COATED ORAL at 10:10

## 2020-08-25 RX ADMIN — FAMOTIDINE 20 MG: 20 TABLET, FILM COATED ORAL at 10:09

## 2020-08-25 RX ADMIN — SODIUM CHLORIDE: 9 INJECTION, SOLUTION INTRAVENOUS at 09:33

## 2020-08-25 RX ADMIN — SODIUM CHLORIDE, PRESERVATIVE FREE 10 ML: 5 INJECTION INTRAVENOUS at 10:10

## 2020-08-25 RX ADMIN — ENOXAPARIN SODIUM 40 MG: 40 INJECTION SUBCUTANEOUS at 10:10

## 2020-08-25 ASSESSMENT — PAIN SCALES - GENERAL
PAINLEVEL_OUTOF10: 0
PAINLEVEL_OUTOF10: 0

## 2020-08-25 NOTE — DISCHARGE SUMMARY
Discharge Summary Note  Patient ID:  Emil Crowder  [id number]  68 y.o.  1950    Admit date: 8/24/2020    Discharge date and time: No discharge date for patient encounter.      Admitting Physician: Paul Nelson MD     Discharge Physician: Jesus Ji MD    Admission Diagnoses:   Dizziness [R42]  Non-intractable vomiting with nausea, unspecified vomiting type [R11.2]  Vertigo of central origin [H81.4]    Discharge Diagnoses and hospital course:   Dizziness-Resolved  Likely dehydration  Improved with fluids  Initial Troponin negative  EKG with no ST/T changes  CT head with no acute changes  Repeat troponin's negative  MRI ruled out any acute stroke  Cardiology recommendations appreciated and said okay with discharging and following up with his cardiologist as out patient to get stress test   Ordered 2D ECHO, pending results     Hyperparathyroidism s/p  Parathyroidectomy on 5/21/2020  Continue home calcium replacements     CAD/Ischemic cardiomyopathy  Small inferior wall infarct with no ischemia per [facility] records  RCA occlusion in 2004  On Aspirin, will continue  Continue home medications     HLD  Continue Statin     Hypertension  Hold Coreg and Lisinopril     Admission Condition: fair    Discharged Condition: stable    Consults: cardiology    Significant Diagnostic Studies:   CBC with Differential:    Lab Results   Component Value Date    WBC 13.7 08/25/2020    RBC 4.39 08/25/2020    HGB 13.4 08/25/2020    HCT 40.4 08/25/2020     08/25/2020    MCV 92.0 08/25/2020    MCH 30.5 08/25/2020    MCHC 33.2 08/25/2020    RDW 12.9 08/25/2020    SEGSPCT 67.0 08/24/2020    LYMPHOPCT 22.8 08/24/2020    MONOPCT 6.2 08/24/2020    BASOPCT 0.8 08/24/2020    MONOSABS 0.6 08/24/2020    LYMPHSABS 2.2 08/24/2020    EOSABS 0.2 08/24/2020    BASOSABS 0.1 08/24/2020    DIFFTYPE AUTOMATED DIFFERENTIAL 08/24/2020     CMP:    Lab Results   Component Value Date     08/25/2020    K 4.3 08/25/2020     08/25/2020    CO2 21 08/25/2020    BUN 18 08/25/2020    CREATININE 0.7 08/25/2020    GFRAA >60 08/25/2020    LABGLOM >60 08/25/2020    GLUCOSE 114 08/25/2020    PROT 6.7 08/24/2020    LABALBU 4.3 08/24/2020    CALCIUM 8.9 08/25/2020    BILITOT 0.4 08/24/2020    ALKPHOS 49 08/24/2020    AST 32 08/24/2020    ALT 58 08/24/2020     Ct Head Wo Contrast    Result Date: 8/24/2020  EXAMINATION: CT OF THE HEAD WITHOUT CONTRAST  8/24/2020 6:37 am TECHNIQUE: CT of the head was performed without the administration of intravenous contrast. Dose modulation, iterative reconstruction, and/or weight based adjustment of the mA/kV was utilized to reduce the radiation dose to as low as reasonably achievable. COMPARISON: None. HISTORY: ORDERING SYSTEM PROVIDED HISTORY: dizziness, nausea/vomiting, started at [address] [place] [address] [place]: Has a \"code stroke\" or \"stroke alert\" been called? ->Yes Reason for exam:->dizziness, nausea/vomiting, started at 4am Reason for Exam: joiner/nausea FINDINGS: BRAIN/VENTRICLES: There is no acute intracranial hemorrhage, mass effect or midline shift. No abnormal extra-axial fluid collection. The gray-white differentiation is maintained without evidence of an acute infarct. There is no evidence of hydrocephalus. Ill-defined hypoattenuation is noted within cerebral white matter consistent with mild microvascular ischemic disease. ORBITS: The visualized portion of the orbits demonstrate no acute abnormality. SINUSES: The visualized paranasal sinuses and mastoid air cells demonstrate no acute abnormality. SOFT TISSUES/SKULL:  No acute abnormality of the visualized skull or soft tissues. No acute intracranial abnormality. Mild cerebral white matter chronic microvascular ischemic disease. RECOMMENDATIONS: For clinical suspicion of acute ischemia, recommend MRI brain with diffusion-weighted imaging for further evaluation.      Xr Chest Portable    Result Date: 8/24/2020  EXAMINATION: ONE XRAY VIEW OF THE CHEST 8/24/2020 6:49 am COMPARISON: None. HISTORY: Acute dizziness with nausea/vomiting. FINDINGS: Cardiomediastinal silhouette and pulmonary vasculature are normal. No consolidation, pleural effusion, or pneumothorax. No acute abnormality. Cta Head Neck W Contrast    Result Date: 8/24/2020  EXAMINATION: CTA OF THE HEAD AND NECK WITH CONTRAST 8/24/2020 6:45 am: TECHNIQUE: CTA of the head and neck was performed with the administration of intravenous contrast. Multiplanar reformatted images are provided for review. MIP images are provided for review. Stenosis of the internal carotid arteries measured using NASCET criteria. Dose modulation, iterative reconstruction, and/or weight based adjustment of the mA/kV was utilized to reduce the radiation dose to as low as reasonably achievable. COMPARISON: CT head without contrast August 24, 2020 at 0637 hours. HISTORY: ORDERING SYSTEM PROVIDED HISTORY: dizziness, nausea/vomiting, started at [address] [place]: Reason for exam:->dizziness, nausea/vomiting, started at 4am Reason for Exam: stroke alert FINDINGS: CTA NECK: AORTIC ARCH/ARCH VESSELS: No dissection or arterial injury. No significant stenosis of the brachiocephalic or subclavian arteries. CAROTID ARTERIES: No dissection, arterial injury, or hemodynamically significant stenosis by NASCET criteria. VERTEBRAL ARTERIES: No dissection, arterial injury, or significant stenosis. SOFT TISSUES: The lung apices are clear. No cervical or superior mediastinal lymphadenopathy. The larynx and pharynx are unremarkable. No acute abnormality of the salivary and thyroid glands. BONES: No acute osseous abnormality. CTA HEAD: ANTERIOR CIRCULATION: No significant stenosis of the intracranial internal carotid, anterior cerebral, or middle cerebral arteries. No aneurysm. POSTERIOR CIRCULATION: No significant stenosis of the vertebral, basilar, or posterior cerebral arteries. No aneurysm.  OTHER: No dural venous sinus thrombosis on this non-dedicated study. BRAIN: No mass effect or midline shift. No extra-axial fluid collection. The gray-white differentiation is maintained. Ill-defined hypoattenuation is noted within cerebral white matter consistent with mild microvascular ischemic change. Unremarkable CTA of the head and neck. Mild cerebral white matter chronic microvascular ischemic disease. Mri Brain Wo Contrast    Result Date: 8/24/2020  EXAMINATION: MRI OF THE BRAIN WITHOUT CONTRAST,  8/24/2020 10:36 am TECHNIQUE: Multiplanar multisequence MRI of the brain was performed without the administration of intravenous contrast. COMPARISON: Head CT on 08/24/2020 HISTORY: ORDERING SYSTEM PROVIDED HISTORY: Vertigo TECHNOLOGIST PROVIDED HISTORY: Reason for exam:-> Vertigo Reason for Exam: Dizziness/nausea vomiting FINDINGS: INTRACRANIAL STRUCTURES/VENTRICLES: There are no areas of restricted diffusion to suggest an acute infarct. The study is degraded by patient motion artifact. The cerebral and cerebellar parenchyma demonstrate volume loss. Scattered areas of increased T2 signal are noted supratentorially, compatible with moderate chronic microvascular white matter ischemic disease. There are no abnormal extra-axial fluid collections. The ventricles are normal in size. Normal major intracranial flow voids are noted. There are no areas of blooming artifact noted on the gradient echo sequences to suggest sequela of acute or chronic hemorrhage. ORBITS: The visualized portion of the orbits demonstrate no acute abnormality. SINUSES: There is scattered paranasal sinus disease, with the greatest involvement in the posterior ethmoid air cells, sphenoid sinuses, and left maxillary sinus. The mastoid air cells are clear. BONES/SOFT TISSUES: The bone marrow signal intensity and craniocervical junction are unremarkable. Pituitary gland is normal in appearance. 1. No evidence of an acute infarct.  2. Moderate chronic microvascular white matter ischemic disease with associated cerebral parenchymal volume loss.      Discharge Exam:  General Appearance: alert and oriented to person, place and time, in no acute distress  Cardiovascular: normal rate, regular rhythm, normal S1 and S2  Pulmonary/Chest: clear to auscultation bilaterally  Abdomen: soft, non-tender, non-distended, normal bowel sounds, no masses   Extremities: no cyanosis, clubbing or edema, pulse   Skin: warm and dry, no rash or erythema  Head: normocephalic and atraumatic  Eyes: pupils equal, round, and reactive to light  Neck: supple and non-tender without mass, no thyromegaly   Musculoskeletal: normal range of motion, no joint swelling, deformity or tenderness  Neurological: alert, oriented, normal speech, no focal findings or movement disorder noted    Disposition: home    Patient Instructions:     Discharge Medications:   Bryanna Kim"   Home Medication Instructions BOC:[id number]    Printed on:08/25/20 2435   Medication Information                      aspirin 81 MG chewable tablet  Take 81 mg by mouth nightly              atorvastatin (LIPITOR) 10 MG tablet  Take 10 mg by mouth nightly              calcium carbonate (TUMS) 500 MG chewable tablet  Take 500 mg by mouth             calcium-vitamin D (OSCAL-500) 500-200 MG-UNIT per tablet  Take 1 tablet by mouth daily             finasteride (PROSCAR) 5 MG tablet  Take 5 mg by mouth daily             lisinopril (PRINIVIL;ZESTRIL) 10 MG tablet  Take 10 mg by mouth daily             spironolactone (ALDACTONE) 25 MG tablet  Take 25 mg by mouth daily             tamsulosin (FLOMAX) 0.4 MG capsule  Take 0.4 mg by mouth nightly                  Activity: activity as tolerated    Diet: cardiac diet    Wound Care: none needed    Follow-up:  PCP 1-2 weeks  Cardiology 2-4 weeks    Time Spent Doing discharge 25 min  Electronically signed by Carlotta Guadarrama MD  on 8/25/20 at 11:46 AM EDT

## 2020-08-25 NOTE — CONSULTS
Daily with breakfast  tamsulosin (FLOMAX) capsule 0.4 mg, Nightly  finasteride (PROSCAR) tablet 5 mg, Daily  atorvastatin (LIPITOR) tablet 10 mg, Nightly  aspirin chewable tablet 81 mg, Nightly      Current Facility-Administered Medications   Medication Dose Route Frequency Provider Last Rate Last Dose    0.9 % sodium chloride infusion   Intravenous Continuous Javier Thorpe  mL/hr at 08/24/20 1854      sodium chloride flush 0.9 % injection 10 mL  10 mL Intravenous BID Javier Thorpe MD        sodium chloride flush 0.9 % injection 10 mL  10 mL Intravenous 2 times per day Javier Thorpe MD   10 mL at 08/24/20 2033    sodium chloride flush 0.9 % injection 10 mL  10 mL Intravenous PRN Javier Thorpe MD        acetaminophen (TYLENOL) tablet 650 mg  650 mg Oral Q6H PRN Javier Thorpe MD        Or    acetaminophen (TYLENOL) suppository 650 mg  650 mg Rectal Q6H PRN Javier Thorpe MD        polyethylene glycol (GLYCOLAX) packet 17 g  17 g Oral Daily PRN Javier Thorpe MD        promethazine (PHENERGAN) tablet 12.5 mg  12.5 mg Oral Q6H PRN Javier Thorpe MD        Or    ondansetron [facility]) injection 4 mg  4 mg Intravenous Q6H PRN Javier Thorpe MD        enoxaparin (LOVENOX) injection 40 mg  40 mg Subcutaneous Daily Javier Thorpe MD   40 mg at 08/24/20 2032    famotidine (PEPCID) tablet 20 mg  20 mg Oral BID Javier Thorpe MD   20 mg at 08/24/20 2034    meclizine (ANTIVERT) tablet 12.5 mg  12.5 mg Oral TID PRN Javier Thorpe MD   12.5 mg at 08/24/20 2032    calcium-vitamin D 500-200 MG-UNIT per tablet 1 tablet  1 tablet Oral Daily with breakfast Javier Thorpe MD        tamsulosin [facility]) capsule 0.4 mg  0.4 mg Oral Nightly Ni Bailey PA-C        finasteride (PROSCAR) tablet 5 mg  5 mg Oral Daily Ni Bailey PA-C        atorvastatin (LIPITOR) tablet 10 mg  10 mg Oral Ya Rivers PA-C        aspirin chewable tablet 81 mg  81 mg Oral Tylorly Almas Rivers PA-C         Review of Systems:   · Constitutional: No Fever or Weight Loss   · Eyes: No Decreased Vision  · ENT: No Headaches, Hearing Loss or Vertigo  · Cardiovascular: No chest pain, dyspnea on exertion, palpitations or loss of consciousness  · Respiratory: No cough or wheezing    · Gastrointestinal: No abdominal pain, appetite loss, blood in stools, constipation, diarrhea or heartburn  · Genitourinary: No dysuria, trouble voiding, or hematuria  · Musculoskeletal:  No gait disturbance, weakness or joint complaints  · Integumentary: No rash or pruritis  · Neurological: No TIA or stroke symptoms  · Psychiatric: No anxiety or depression  · Endocrine: No malaise, fatigue or temperature intolerance  · Hematologic/Lymphatic: No bleeding problems, blood clots or swollen lymph nodes  · Allergic/Immunologic: No nasal congestion or hives  All systems negative except as marked. ·   ·      Physical Examination:    Vitals:    08/25/20 0402   BP: 99/61   Pulse: 63   Resp: 20   Temp: 98 °F (36.7 °C)   SpO2:       Wt Readings from Last 3 Encounters:   08/24/20 230 lb (104.3 kg)     Body mass index is 33 kg/m². General Appearance:  No distress, conversant    Constitutional:  Well developed, Well nourished, No acute distress, Non-toxic appearance. HENT:  Normocephalic, Atraumatic, Bilateral external ears normal, Oropharynx moist, No oral exudates, Nose normal. Neck- Normal range of motion, No tenderness, Supple, No stridor,no apical-carotid delay, no carotid bruit  Eyes:  PERRL, EOMI, Conjunctiva normal, No discharge. Respiratory:  Normal breath sounds, No respiratory distress, No wheezing, No chest tenderness. ,no use of accessory muscles, diaphragm movement is normal  Cardiovascular: (PMI) apex non displaced,no lifts no thrills, no s3,no s4, Normal heart rate, Normal rhythm, No murmurs, No rubs, No gallops.  Carotid arteries pulse and amplitude are normal no bruit, no abdominal bruit noted ( normal abdominal aorta ausculation), femoral arteries pulse and amplitude are normal no bruit, pedal pulses are normal  GI:  Bowel sounds normal, Soft, No tenderness, No masses, No pulsatile masses, no hepatosplenomegally, no bruits  : External genitalia appear normal, No masses or lesions. No discharge. No CVA tenderness. Musculoskeletal:  Intact distal pulses, No edema, No tenderness, No cyanosis, No clubbing. Good range of motion in all major joints. No tenderness to palpation or major deformities noted. Back- No tenderness. Integument:  Warm, Dry, No erythema, No rash. Skin: no rash, no ulcers  Lymphatic:  No lymphadenopathy noted. Neurologic:  Alert & oriented x 3, Normal motor function, Normal sensory function, No focal deficits noted. Psychiatric:  Affect normal, Judgment normal, Mood normal.   Lab Review   Recent Labs     08/25/20  0337   WBC 13.7*   HGB 13.4*   HCT 40.4*         Recent Labs     08/25/20  0337      K 4.3      CO2 21   BUN 18   CREATININE 0.7*     Recent Labs     08/24/20  0615   AST 32   ALT 58*   BILITOT 0.4   ALKPHOS 49     No results for input(s): TROPONINI in the last 72 hours. No results found for: BNP  Lab Results   Component Value Date    INR 0.98 08/24/2020    PROTIME 11.9 08/24/2020         EKG:nsr    Chest Xray:[facility]    [address], echo, meds reviewed  Assessment: 68 y. o.year old with PMH of  has a past medical history of CAD (coronary artery disease) and Hypertension. Recommendations:    1. Dizziness: ACS ruled out, its not vertigo, will get telemetery monitor,will get echo inhouse, and out patient stress test and event monitor, he has a cardiologist in [facility],  2. HTN: his blood pressure is low normal  3. Elevated wbc: recommend to follow up with medicine  4.  CAD: he is known to have CAD, out patient follow up is recommended, continue aspirin, statins. All labs, medications and tests reviewed, continue all other medications of all above medical condition listed as is.          Cindy Thakkar MD, 8/25/2020 7:05 AM

## 2020-08-25 NOTE — CARE COORDINATION
Chart reviewed, CM called and spoke with patient on the hospital phone for dc planning. Introduced self and reason for call. Pt was admitted for dizziness. States he lives at home with his wife/Amanda and is independent with all needs. Explained he follows with PCP, has insurance with affordable RX co-pays and reliable transportation per himself or his wife. Discussed HHC and pt declined [facility] need nor any other discharge needs. CM remains available if needs arise.

## 2023-09-15 ENCOUNTER — HOSPITAL ENCOUNTER (OUTPATIENT)
Dept: CARDIAC REHAB | Age: 73
Discharge: HOME | End: 2023-09-15
Payer: MEDICARE

## 2023-09-15 DIAGNOSIS — I10 ESSENTIAL HYPERTENSION, BENIGN: ICD-10-CM

## 2023-09-15 DIAGNOSIS — I25.5 ISCHEMIC CARDIOMYOPATHY: ICD-10-CM

## 2023-09-15 PROCEDURE — 93306 TTE W/DOPPLER COMPLETE: CPT
